# Patient Record
Sex: FEMALE | Race: WHITE | Employment: UNEMPLOYED | URBAN - METROPOLITAN AREA
[De-identification: names, ages, dates, MRNs, and addresses within clinical notes are randomized per-mention and may not be internally consistent; named-entity substitution may affect disease eponyms.]

---

## 2018-07-21 ENCOUNTER — HOSPITAL ENCOUNTER (EMERGENCY)
Facility: CLINIC | Age: 48
Discharge: HOME | End: 2018-07-21
Attending: EMERGENCY MEDICINE | Admitting: EMERGENCY MEDICINE
Payer: COMMERCIAL

## 2018-07-21 VITALS
OXYGEN SATURATION: 99 % | DIASTOLIC BLOOD PRESSURE: 59 MMHG | BODY MASS INDEX: 24.33 KG/M2 | TEMPERATURE: 98.3 F | HEIGHT: 67 IN | RESPIRATION RATE: 18 BRPM | WEIGHT: 155 LBS | SYSTOLIC BLOOD PRESSURE: 124 MMHG

## 2018-07-21 DIAGNOSIS — T78.40XA ALLERGIC REACTION, INITIAL ENCOUNTER: ICD-10-CM

## 2018-07-21 DIAGNOSIS — T63.461A WASP STING, ACCIDENTAL OR UNINTENTIONAL, INITIAL ENCOUNTER: ICD-10-CM

## 2018-07-21 PROCEDURE — 25000125 ZZHC RX 250: Performed by: EMERGENCY MEDICINE

## 2018-07-21 PROCEDURE — 25000132 ZZH RX MED GY IP 250 OP 250 PS 637: Performed by: EMERGENCY MEDICINE

## 2018-07-21 PROCEDURE — 99285 EMERGENCY DEPT VISIT HI MDM: CPT

## 2018-07-21 PROCEDURE — 96372 THER/PROPH/DIAG INJ SC/IM: CPT

## 2018-07-21 RX ORDER — EPINEPHRINE 0.3 MG/.3ML
0.3 INJECTION SUBCUTANEOUS PRN
Qty: 0.6 ML | Refills: 1 | Status: SHIPPED | OUTPATIENT
Start: 2018-07-21

## 2018-07-21 RX ORDER — ETONOGESTREL AND ETHINYL ESTRADIOL VAGINAL RING .015; .12 MG/D; MG/D
1 RING VAGINAL
COMMUNITY

## 2018-07-21 RX ORDER — CETIRIZINE HYDROCHLORIDE 10 MG/1
10 TABLET ORAL DAILY
COMMUNITY

## 2018-07-21 RX ORDER — PREDNISONE 20 MG/1
40 TABLET ORAL ONCE
Status: COMPLETED | OUTPATIENT
Start: 2018-07-21 | End: 2018-07-21

## 2018-07-21 RX ORDER — PREDNISONE 20 MG/1
TABLET ORAL
Qty: 10 TABLET | Refills: 0 | Status: SHIPPED | OUTPATIENT
Start: 2018-07-22

## 2018-07-21 RX ORDER — CETIRIZINE HYDROCHLORIDE 10 MG/1
10 TABLET ORAL ONCE
Status: COMPLETED | OUTPATIENT
Start: 2018-07-21 | End: 2018-07-21

## 2018-07-21 RX ADMIN — EPINEPHRINE 0.3 MG: 1 INJECTION, SOLUTION, CONCENTRATE INTRAVENOUS at 09:34

## 2018-07-21 RX ADMIN — CETIRIZINE HYDROCHLORIDE 10 MG: 10 TABLET, FILM COATED ORAL at 09:19

## 2018-07-21 RX ADMIN — PREDNISONE 40 MG: 20 TABLET ORAL at 09:33

## 2018-07-21 ASSESSMENT — ENCOUNTER SYMPTOMS
COLOR CHANGE: 1
WOUND: 1

## 2018-07-21 NOTE — ED AVS SNAPSHOT
[facility]    [address]    [facility] [address]    Phone:  [phone]    Fax:  [phone]                                       Brenda Camargo   MRN: [id number]    Department:  [facility] Emergency Department   Date of Visit:  7/21/2018           Patient Information     Date Of Birth          1970        Your diagnoses for this visit were:     Allergic reaction, initial encounter     Wasp sting, accidental or unintentional, initial encounter        You were seen by Vilma Hawthorne MD.      Follow-up Information     Follow up with [facility].    Specialty:  EMERGENCY MEDICINE    Why:  immediately , If symptoms worsen    Contact information:    [address]  [facility] [address] [phone]        Follow up with No Ref-Primary, Physician In 3 days.    Why:  As needed        Discharge Instructions       Discharge Instructions  Allergic Reaction    An allergic reaction can result in a rash, itching, swelling, watery eyes, or a runny nose. A serious reaction can cause swelling of your mouth or throat, or wheezing. The most serious allergy is called analphylaxis, and can be life-threatening. Many allergies result in hives, also called urticaria.     An allergy happens when the body s natural defense system (immune system) overreacts to something harmless. The thing that triggers your allergic reaction is called an allergen. The first time you are exposed to your allergen, you may not have any reaction, but the body makes a protein called an antibody. The antibody lets the body recognize and remember the allergen.  Every time you are exposed to your allergen you get more antibody and your reaction can be more severe.      Call 911 if you have:    Swelling of the lips, tongue or throat.    Hoarse voice, drooling or trouble breathing.    Chest pain or shortness of breath.    Fainting or  unconsciousness.    Return to the Emergency Department if:    You develop a fever.    You have significant abdominal pain.    You vomit more than once.    Your rash changes or looks very different.    What can I do to help myself?    If you know what caused your allergy, don t touch it, throw any of it away, and tell others not to have it around you. Wear a medical alert bracelet with a name of your allergen on it.    If you don t know what you are allergic to, keep a journal of everything that you are exposed to (foods, soaps, medicines, etc.). Take this with you when you follow up with your primary doctor. This may help determine what is causing the allergic reaction.    Take any medicines that are prescribed.    Antihistamines can decrease rash or itching. You may use Benadryl  (diphenhydramine) for rash or itching according to package directions, or use a prescription antihistamine as recommended by your physician.    For significant allergic reactions, you may have been given an epinephrine (adrenaline) auto injector (EpiPen ). Carry this with you at all times! Use it if you are having any symptoms of anaphylaxis.  Do not be afraid to use it. Always call 911 if you use your EpiPen ! It is only meant to buy time until you can get to the Emergency Department!  If you were given a prescription for medicine here today, be sure to read all of the information (including the package insert) that comes with your prescription.  This will include important information about the medicine, its side effects, and any warnings that you need to know about.  The pharmacist who fills the prescription can provide more information and answer questions you may have about the medicine.  If you have questions or concerns that the pharmacist cannot address, please call or return to the Emergency Department.         24 Hour Appointment Hotline       To make an appointment at any [facility], call [phone] ([phone]). If  you don't have a family doctor or clinic, we will help you find one. [facility] are conveniently located to serve the needs of you and your family.             Review of your medicines      Our records show that you are taking the medicines listed below. If these are incorrect, please call your family doctor or clinic.        Dose / Directions Last dose taken    cetirizine 10 MG tablet   Commonly known as:  zyrTEC   Dose:  10 mg        Take 10 mg by mouth daily   Refills:  0        etonogestrel-ethinyl estradiol 0.12-0.015 MG/24HR vaginal ring   Commonly known as:  NUVARING   Dose:  1 each        Place 1 each vaginally every 28 days   Refills:  0                Procedures and tests performed during your visit     Pulse oximetry nursing      Orders Needing Specimen Collection     None      Pending Results     No orders found from 7/19/2018 to 7/22/2018.            Pending Culture Results     No orders found from 7/19/2018 to 7/22/2018.            Pending Results Instructions     If you had any lab results that were not finalized at the time of your Discharge, you can call the ED Lab Result RN at [phone]. You will be contacted by this team for any positive Lab results or changes in treatment. The nurses are available 7 days a week from 10A to 6:30P.  You can leave a message 24 hours per day and they will return your call.        Test Results From Your Hospital Stay               Clinical Quality Measure: Blood Pressure Screening     Your blood pressure was checked while you were in the emergency department today. The last reading we obtained was  BP: 107/64 . Please read the guidelines below about what these numbers mean and what you should do about them.  If your systolic blood pressure (the top number) is less than 120 and your diastolic blood pressure (the bottom number) is less than 80, then your blood pressure is normal. There is nothing more that you need to do about it.  If your systolic blood  "pressure (the top number) is 120-139 or your diastolic blood pressure (the bottom number) is 80-89, your blood pressure may be higher than it should be. You should have your blood pressure rechecked within a year by a primary care provider.  If your systolic blood pressure (the top number) is 140 or greater or your diastolic blood pressure (the bottom number) is 90 or greater, you may have high blood pressure. High blood pressure is treatable, but if left untreated over time it can put you at risk for heart attack, stroke, or kidney failure. You should have your blood pressure rechecked by a primary care provider within the next 4 weeks.  If your provider in the emergency department today gave you specific instructions to follow-up with your doctor or provider even sooner than that, you should follow that instruction and not wait for up to 4 weeks for your follow-up visit.        Thank you for choosing [facility]       Thank you for choosing [facility] for your care. Our goal is always to provide you with excellent care. Hearing back from our patients is one way we can continue to improve our services. Please take a few minutes to complete the written survey that you may receive in the mail after you visit with us. Thank you!        [employer] Information     [employer] lets you send messages to your doctor, view your test results, renew your prescriptions, schedule appointments and more. To sign up, go to [email or web address] . Click on \"Log in\" on the left side of the screen, which will take you to the Welcome page. Then click on \"Sign up Now\" on the right side of the page.     You will be asked to enter the access code listed below, as well as some personal information. Please follow the directions to create your username and password.     Your access code is: [email or web address]  Expires: 10/19/2018 11:21 AM     Your access code will  in 90 days. If you need help or a new code, please call your [facility] clinic or " [phone].        Care EveryWhere ID     This is your Care EveryWhere ID. This could be used by other organizations to access your [facility] medical records  [id number]        Equal Access to Services     BHAVANA BRANNON : Cristiane Fishman, maddy dunne, qaoliviata katrishbritt booth, alka ford. So [facility] [phone].    ATENCIÓN: Si habla español, tiene a grant disposición servicios gratuitos de asistencia lingüística. Llame al [phone].    We comply with applicable federal civil rights laws and [place] laws. We do not discriminate on the basis of race, color, national origin, age, disability, sex, sexual orientation, or gender identity.            After Visit Summary       This is your record. Keep this with you and show to your community pharmacist(s) and doctor(s) at your next visit.

## 2018-07-21 NOTE — ED NOTES
No longer having the itchy eyes or feeling like eyes are swollen.  No hives, throat or tongue swelling.

## 2018-07-21 NOTE — ED PROVIDER NOTES
"  History     Chief Complaint:    Allergic Reaction      HPI   Brenda Camargo is a 47 year old female who presents to the ED for evaluation of an allergic reaction secondary to a wasp sting. The patient reports that she was stung by a wasp around 0840 this morning. She gave herself an epipen about a minute after the sting as well as 25 mg of Benadryl prior to presenting. At the time of exam, she states that she feels like her eyes are starting to swell and that her tongue is starting to tingle. She notes that she has been stung by wasps several times in the past but states that she had an anaphylactic reaction for the first time in August of 2017. This is the first time she has been stung by a wasp since that time.      Allergies:  Sulfa drugs     Medications:    Zyrtec  Nuvaring  Epipen  Benadryl    Past Medical History:    Anaphylactic rxn secondary to a wasp Aug. 2017.     Past Surgical History:    Cholecystectomy    Family History:    No past pertinent family history.    Social History:  Negative for tobacco use.  Positive for alcohol use.   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       Review of Systems   Skin: Positive for color change (erythema at the site of the wasp sting) and wound (puncture wound secondary to epipen).   Allergic/Immunologic: Positive for environmental allergies.   All other systems reviewed and are negative.      Physical Exam   First Vitals:  BP: 146/88  Heart Rate: 92  Temp: 98.3  F (36.8  C)  Resp: 18  Height: 170.2 cm (5' 7\")  Weight: 70.3 kg (155 lb)  SpO2: 98 %      Physical Exam  Gen: Pleasant, appears stated age.    Eye:   Pupils are equal, round, and reactive.     Sclera non-injected.    ENT:   Moist mucus membranes.     Normal tongue.    Oropharynx without lesions.   Mild periorbital edema.    Cardiac:     Normal rate and regular rhythm.    No murmurs, gallops, or rubs.    Pulmonary:     Clear to auscultation " bilaterally.    No wheezes, rales, or rhonchi.    Abdomen:     Normal active bowel sounds.     Abdomen is soft and non-distended, without focal tenderness.    Musculoskeletal:     Normal movement of all extremities without evidence for deficit.    Extremities:    No edema.    Skin:   Warm and dry.   Puncture wound right anterior thigh.   4 cm area of erythema warm to touch on the left proximal arm.    Neurologic:    Non-focal exam without asymmetric weakness or numbness.    Normal tone    Psychiatric:     Normal affect with appropriate interaction with examiner.         Emergency Department Course   Interventions:  0919 Zyrtec 10 mg PO  0933 Deltasone 40 mg PO  0934 Epinephrine 0.3 mg IM      Emergency Department Course:  Nursing notes and vitals reviewed. (0913) I performed an exam of the patient as documented above.     Medicine administered as documented above.     1023 I rechecked the patient and discussed the results of her workup thus far. Patient is feeling better at this time with resolution of eyelid swelling.  No other new symptoms.  She is well appearing.     Findings and plan explained to the Patient. Patient discharged home with instructions regarding supportive care, medications, and reasons to return. The importance of close follow-up was reviewed. The patient was prescribed epinephrine.          Impression & Plan    Medical Decision Making:  Brenda Camargo is a 47 year old female with history of previous anaphylaxis with wasp sting who presents following a wasp sting and self-administration of epinephrine. On exam, the patient is well appearing. She has severe mild symptoms of allergic reaction with periorbital swelling. She started to complain of tingling at the tip of her tongue. Based on previous severe reaction, additional epinephrine was administered. She tolerated this well. She was observed for a period of 2 hours with resolution of her symptoms. She does have an additional two Epipens at  home but I have provided her with another prescription incase. She will continue prednisone over the next several days. Otherwise, she will return to the ED if she has any recurrence of symptoms such as eye swelling, lip or tongue swelling, or difficulty breathing. She understands indications for administration of Epipen and she's demonstrated that she is able to do this. If she does administer an Epipen she will return to the ED immediately   Critical Care time:  none    Diagnosis:    ICD-10-CM    1. Allergic reaction, initial encounter T78.40XA    2. Wasp sting, accidental or unintentional, initial encounter T63.461A        Disposition:  discharged to home    Discharge Prescriptions:  Epinephrine     Scribe Disclosure:  IGerard, am serving as a scribe on 7/21/2018 at 9:13 AM to personally document services performed by Vilma Hawthorne MD based on my observations and the provider's statements to me.          Gerard Gloria  7/21/2018   [facility] EMERGENCY DEPARTMENT       Vilma Hawthorne MD  07/21/18 [id number]

## 2018-07-21 NOTE — ED NOTES
Remains free of hives, throat or tongue swelling.  Resp even and equal; no distress noted.  Satting 100% on RA. Eyes continue with itching and feeling like they are swollen.

## 2018-07-21 NOTE — ED TRIAGE NOTES
Trail running when stung on arm by wasp; grabbed epi pen and injected self along with taking oral benadryl at 0840.  Recent anaphylactic reaction to wasp in August of 2017.  Eyes are starting to itch now.  No hives, tongue or throat swelling.  LS=-clear t/o posterior. ABCD's intact; A/o x 4.

## 2018-07-21 NOTE — ED NOTES
Reports eyes feel itchy and swollen along with tongue starting to itch.  No visible swelling noted.

## 2018-07-21 NOTE — ED AVS SNAPSHOT
[facility]    [address]    [facility] [address]    Phone:  [phone]    Fax:  [phone]                                       Brenda Camargo   MRN: [id number]    Department:  [facility]   Date of Visit:  7/21/2018           After Visit Summary Signature Page     I have received my discharge instructions, and my questions have been answered. I have discussed any challenges I see with this plan with the nurse or doctor.    ..........................................................................................................................................  Patient/Patient Representative Signature      ..........................................................................................................................................  Patient Representative Print Name and Relationship to Patient    ..................................................               ................................................  Date                                            Time    ..........................................................................................................................................  Reviewed by Signature/Title    ...................................................              ..............................................  Date                                                            Time